# Patient Record
Sex: FEMALE
[De-identification: names, ages, dates, MRNs, and addresses within clinical notes are randomized per-mention and may not be internally consistent; named-entity substitution may affect disease eponyms.]

---

## 2018-02-12 ENCOUNTER — HOSPITAL ENCOUNTER (EMERGENCY)
Dept: HOSPITAL 31 - C.ER | Age: 63
Discharge: HOME | End: 2018-02-12
Payer: MEDICAID

## 2018-02-12 VITALS — DIASTOLIC BLOOD PRESSURE: 87 MMHG | HEART RATE: 80 BPM | SYSTOLIC BLOOD PRESSURE: 136 MMHG | TEMPERATURE: 98 F

## 2018-02-12 VITALS — OXYGEN SATURATION: 98 % | RESPIRATION RATE: 18 BRPM

## 2018-02-12 DIAGNOSIS — M54.16: Primary | ICD-10-CM

## 2018-02-12 DIAGNOSIS — N39.0: ICD-10-CM

## 2018-02-12 LAB
BACTERIA #/AREA URNS HPF: (no result) /[HPF]
BILIRUB UR-MCNC: NEGATIVE MG/DL
GLUCOSE UR STRIP-MCNC: NORMAL MG/DL
LEUKOCYTE ESTERASE UR-ACNC: (no result) LEU/UL
PH UR STRIP: 6 [PH] (ref 5–8)
PROT UR STRIP-MCNC: NEGATIVE MG/DL
RBC # UR STRIP: (no result) /UL
SP GR UR STRIP: 1.02 (ref 1–1.03)
SQUAMOUS EPITHIAL: 6 /HPF (ref 0–5)
URINE NITRATE: NEGATIVE
UROBILINOGEN UR-MCNC: NORMAL MG/DL (ref 0.2–1)

## 2018-02-12 NOTE — C.PDOC
History Of Present Illness


Patient c/o left low back pain, radiating to her left buttock and left groin 

for 3 days. Pt has h/o RA, SLE, kidney stones. Patient denies any injuries. 

Pain is worse with movement and bending. Patient denies any urinary symptoms at 

this time.


Time Seen by Provider: 02/12/18 11:17


Chief Complaint (Nursing): Back Pain


History Per: Patient


History/Exam Limitations: no limitations


Onset/Duration Of Symptoms: Days


Current Symptoms Are (Timing): Still Present


Severity: Moderate





Past Medical History


Reviewed: Historical Data, Nursing Documentation, Vital Signs


Vital Signs: 


 Last Vital Signs











Temp  98.5 F   02/12/18 10:36


 


Pulse  83   02/12/18 10:36


 


Resp  18   02/12/18 10:36


 


BP  157/87 H  02/12/18 10:36


 


Pulse Ox  98   02/12/18 12:53














- Medical History


PMH: HTN, Hyperlipidemia, Rheumatoid Arthritis


Surgical History: No Surg Hx


Family History: States: No Known Family Hx





- Social History


Hx Alcohol Use: No


Hx Substance Use: No





- Immunization History


Hx Tetanus Toxoid Vaccination: No


Hx Influenza Vaccination: No


Hx Pneumococcal Vaccination: No





Review Of Systems


Except As Marked, All Systems Reviewed And Found Negative.


Genitourinary: Negative for: Dysuria, Incontinence


Musculoskeletal: Positive for: Back Pain (left low back pain)





Physical Exam





- Physical Exam


Appears: Non-toxic, No Acute Distress


Skin: Normal Color, Warm


Head: Atraumatic, Normacephalic


Eye(s): bilateral: Normal Inspection


Nose: Normal


Oral Mucosa: Moist


Neck: Supple


Chest: Symmetrical


Cardiovascular: Rhythm Regular


Respiratory: Normal Breath Sounds, No Accessory Muscle Use, No Rales, No Rhonchi

, No Wheezing


Gastrointestinal/Abdominal: Normal Exam, Soft, No Tenderness


Back: Normal Inspection, No CVA Tenderness


Extremity: Normal ROM


Neurological/Psych: Oriented x3, Normal Speech, Normal Motor, Normal Sensation





ED Course And Treatment


O2 Sat by Pulse Oximetry: 98 (RA)


Pulse Ox Interpretation: Normal





- Other Rad


  ** No standard instances


X-Ray: Viewed By Me, Read By Radiologist


Interpretation: PROCEDURE:  Radiographs of the Lumbar Spine.  HISTORY:  pain, 

no trauma.  COMPARISON:  No prior.  FINDINGS:  BONES:  Vertebral bodies 

maintained in height. Grade 1 anterolisthesis at L4-5. Normal alignment 

maintained elsewhere. Transverse processes and posterior elements appear 

intact.  DISC SPACES:  Unremarkable.  OTHER FINDINGS:  None.  IMPRESSION:  

Grade 1 anterolisthesis at L4-5.


Progress Note: LS Spine X-Ray and UA ordered. Percocet PO given to patient. 

Macrobid was given for UTI. On re-evaluation patient feels better and is stable 

to be d/c home with PMD follow up.





Disposition





- Disposition


Disposition: HOME/ ROUTINE


Disposition Time: 12:50


Condition: STABLE


Additional Instructions: 


Follow up with PMD within 1-2 days. Return to ED if feel worse.


Prescriptions: 


Nitrofurantoin Macrocrystals [Macrobid] 1 cap PO BID #14 cap


oxyCODONE/Acetaminophen [Percocet 5/325 mg Tab] 1 tab PO QID PRN #20 tab


 PRN Reason: Pain


Instructions:  Urinary Tract Infection in Women (ED), Lumbar Radiculopathy (ED)


Forms:  [facility] (English)





- Clinical Impression


Clinical Impression: 


 Lumbar radiculopathy, UTI (urinary tract infection)








- PA / NP / Resident Statement


MD/DO has reviewed & agrees with the documentation as recorded.





- Scribe Statement


The provider has reviewed the documentation as recorded by the Rajivibe


Win Lara





Provider Attestation





All medical record entries made by the Rajivibjill were at my direction and 

personally dictated by me. I have reviewed the chart and agree that the record 

accurately reflects my personal performance of the history, physical exam, 

medical decision making, and the department course for this patient. I have 

also personally directed, reviewed, and agree with the discharge instructions 

and disposition.

## 2018-02-12 NOTE — RAD
PROCEDURE:  Radiographs of the Lumbar Spine.



HISTORY:

pain, no trauma







COMPARISON:

No prior.



FINDINGS:



BONES:

Vertebral bodies maintained in height. Grade 1 anterolisthesis at 

L4-5. Normal alignment maintained elsewhere. Transverse processes and 

posterior elements appear intact.



DISC SPACES:

Unremarkable.



OTHER FINDINGS:

None.



IMPRESSION:

Grade 1 anterolisthesis at L4-5.